# Patient Record
Sex: FEMALE
[De-identification: names, ages, dates, MRNs, and addresses within clinical notes are randomized per-mention and may not be internally consistent; named-entity substitution may affect disease eponyms.]

---

## 2023-08-01 ENCOUNTER — P2P PATIENT RECORD (OUTPATIENT)
Age: 63
End: 2023-08-01

## 2023-08-21 ENCOUNTER — DASHBOARD ENCOUNTERS (OUTPATIENT)
Age: 63
End: 2023-08-21

## 2023-08-23 ENCOUNTER — OFFICE VISIT (OUTPATIENT)
Dept: URBAN - METROPOLITAN AREA CLINIC 40 | Facility: CLINIC | Age: 63
End: 2023-08-23
Payer: MEDICARE

## 2023-08-23 VITALS
TEMPERATURE: 97.9 F | HEIGHT: 60 IN | SYSTOLIC BLOOD PRESSURE: 140 MMHG | HEART RATE: 115 BPM | BODY MASS INDEX: 25.13 KG/M2 | DIASTOLIC BLOOD PRESSURE: 92 MMHG | WEIGHT: 128 LBS

## 2023-08-23 DIAGNOSIS — K21.9 GASTROESOPHAGEAL REFLUX DISEASE, UNSPECIFIED WHETHER ESOPHAGITIS PRESENT: ICD-10-CM

## 2023-08-23 DIAGNOSIS — R19.7 DIARRHEA, UNSPECIFIED TYPE: ICD-10-CM

## 2023-08-23 DIAGNOSIS — K86.1 CHRONIC PANCREATITIS, UNSPECIFIED PANCREATITIS TYPE: ICD-10-CM

## 2023-08-23 PROBLEM — 235494005: Status: ACTIVE | Noted: 2023-08-23

## 2023-08-23 PROBLEM — 235595009: Status: ACTIVE | Noted: 2023-08-23

## 2023-08-23 PROCEDURE — 99203 OFFICE O/P NEW LOW 30 MIN: CPT | Performed by: NURSE PRACTITIONER

## 2023-08-23 RX ORDER — ATORVASTATIN CALCIUM 10 MG/1
1 TABLET TABLET, FILM COATED ORAL ONCE A DAY
Status: ACTIVE | COMMUNITY

## 2023-08-23 RX ORDER — NINTEDANIB 150 MG/1
1 CAPSULE CAPSULE ORAL
Status: ACTIVE | COMMUNITY

## 2023-08-23 RX ORDER — APIXABAN 5 MG/1
1 TABLET TABLET, FILM COATED ORAL TWICE A DAY
Status: ACTIVE | COMMUNITY

## 2023-08-23 RX ORDER — PANTOPRAZOLE SODIUM 40 MG/1
1 TABLET TABLET, DELAYED RELEASE ORAL ONCE A DAY
Status: ACTIVE | COMMUNITY

## 2023-08-23 RX ORDER — LEVOTHYROXINE SODIUM 25 UG/1
1 TABLET IN THE MORNING ON AN EMPTY STOMACH TABLET ORAL ONCE A DAY
Status: ACTIVE | COMMUNITY

## 2023-08-23 RX ORDER — LISINOPRIL 10 MG/1
1 TABLET TABLET ORAL ONCE A DAY
Status: ACTIVE | COMMUNITY

## 2023-08-23 RX ORDER — MONTELUKAST 10 MG/1
1 TABLET TABLET, FILM COATED ORAL ONCE A DAY
Status: ACTIVE | COMMUNITY

## 2023-08-23 RX ORDER — LEVOCETIRIZINE DIHYDROCHLORIDE 5 MG/1
1 TABLET IN THE EVENING TABLET, FILM COATED ORAL ONCE A DAY
Status: ACTIVE | COMMUNITY

## 2023-08-23 RX ORDER — SODIUM CHLORIDE 30 MG/ML INHALATION SOLUTION 30 MG/ML
4 ML SOLUTION INHALANT TWICE A DAY
Status: ACTIVE | COMMUNITY

## 2023-08-23 RX ORDER — ONDANSETRON 4 MG/1
1 TABLET ON THE TONGUE AND ALLOW TO DISSOLVE TABLET, ORALLY DISINTEGRATING ORAL ONCE A DAY
Status: ACTIVE | COMMUNITY

## 2023-08-23 RX ORDER — SERTRALINE HYDROCHLORIDE 100 MG/1
1 TABLET TABLET, FILM COATED ORAL ONCE A DAY
Status: ACTIVE | COMMUNITY

## 2023-08-23 NOTE — HPI-TODAY'S VISIT:
62-year-old female new patient with past medical history as listed below,.  --Seen in the ED July 31, 2023 for emesis and abdominal pain, history of atrial fib, congestive heart failure, COPD, HLD, and pulmonary fibrosis had complaints of bloating and constipation.  -CT abdomen and pelvis shows pulmonary fibrosis that had progressed compared to prior exam.  Liver intrahepatic and extrahepatic biliary ductal dilation, similar to the prior exam.  Common bile duct measures approximately 14 mm in diameter, unchanged in comparison to December 3, 2021.  There are no focal hepatic masses.  Spleen is unremarkable, gallbladder is absent patient is postcholecystectomy.  Pancreas again demonstrated a cystic lesion in the head of the pancreas, 1.2 cm in maximum dimension this is unchanged in comparison to December 2021 there is pancreatic interstitial edema, similar to prior study.  Adrenals no masses, right kidney shows a 1.8 cm right renal cyst.  Left kidney shows several renal cysts the largest being 1.7 cm.  Stomach is normal in appearance, small bowel is normal in appearance, colon the wall portions of the sigmoid colon appear edematous but this may be due to underdistention.  Appendix is not identified there are no abnormal fluid collections, calcific atherosclerosis is present in the aorta.  Impression: Progressive pulmonary fibrosis, intrahepatic biliary ductal dilation unchanged in comparison to 2021, pancreatic interstitial edema, cystic lesion in the head of the pancreas unchanged in comparison to December 2021. -- Labs 07/31/2023: Lipase 102, normal AST ALT  -- Labs 08/18/2023 with PCP: CBC, CMP, normal, stable, at goal except mildly low k+ -- She has preadmit listed in [facility] GI lab dated September 8, 2023 for colonoscopy, She is scheduled 1130 for colonoscopy September 8 with Dr. Pimentel.  -- The patient presents for hospital follow-up after being discharged from the hospital.  She states this is who the hospitalist set her up to see. She states that she sees GI specialist and just had an office visit with them and has a colonoscopy scheduled in 2 weeks.  She already has cardiology clearance for that colonoscopy to hold her Eliquis.  She also has a follow-up scheduled with GI specialist after the colonoscopy.  Discussed with her that hospital has likely referred her due to the findings on her CT scan. I went over with her that the findings regarding the liver and the pancreas are the same and has not progressed from the same findings on scans in 2021.  Discussed with her that we can do further work-up here in regards to stool studies for pancreatic elastase as well as checking her amylase and lipase labs,  and monitor her likely chronic pancreatitis, request records from GI specialists, or if she likes she can continue care with GI specialist and follow-up with them as scheduled.  She states that she is feeling better, she can tell when she eats something wrong. She states she has had diarrhea for 20 years. She has zofran if needed. Her PCP stated her thyroid is normal. She has been working on weight loss and has lost 10 pounds this month. She started Chantix to help with smoking cessation. She has no complaints at present and she says she will hold off on any work-up here and follow-up with GI specialist.